# Patient Record
(demographics unavailable — no encounter records)

---

## 2025-01-03 NOTE — HISTORY OF PRESENT ILLNESS
[de-identified] : Patient is a 11-year-old female accompanied by mother here for evaluation of right pinky finger injury.  Patient states earlier today she was running, ran into a wall and twisted her right Pinky finger backwards.  Patient is here for evaluation.  Reports improvement in her pain.  Right hand exam: No effusion no ecchymosis no erythema.  Tenderness palpation to proximal middle phalanx of pinky, mild tenderness to MCPJ fifth digit.  No gross instability neurovascular intact good capillary refill  X-ray right pinky finger reviewed: Acute avulsion fracture of the radial aspect base middle phalanx  Discussed with patient mother detail that patient has a small ulnar fracture.  These fractures generally heal well on their own with conservative treatment.  Buddy tape patient's fourth and fifth digits for support, advised no gym/sports, advised ice/heat warm water Epsom salt soaks, Motrin/Tylenol as needed for pain will follow-up in 2 to 3 weeks for reevaluation, repeat pinky finger x-rays.  Patient mother understand agree with plan.  Red flag symptoms discussed. All questions and concerns addressed to patient's satisfaction. Patient expresses full understanding of treatment plan.